# Patient Record
Sex: FEMALE | Race: WHITE | Employment: UNEMPLOYED | URBAN - METROPOLITAN AREA
[De-identification: names, ages, dates, MRNs, and addresses within clinical notes are randomized per-mention and may not be internally consistent; named-entity substitution may affect disease eponyms.]

---

## 2021-09-10 ENCOUNTER — APPOINTMENT (OUTPATIENT)
Dept: ULTRASOUND IMAGING | Age: 66
End: 2021-09-10
Attending: EMERGENCY MEDICINE
Payer: MEDICARE

## 2021-09-10 ENCOUNTER — APPOINTMENT (OUTPATIENT)
Dept: GENERAL RADIOLOGY | Age: 66
End: 2021-09-10
Attending: EMERGENCY MEDICINE
Payer: MEDICARE

## 2021-09-10 ENCOUNTER — APPOINTMENT (OUTPATIENT)
Dept: CT IMAGING | Age: 66
End: 2021-09-10
Attending: EMERGENCY MEDICINE
Payer: MEDICARE

## 2021-09-10 ENCOUNTER — HOSPITAL ENCOUNTER (EMERGENCY)
Age: 66
Discharge: HOME | End: 2021-09-10
Attending: EMERGENCY MEDICINE
Payer: MEDICARE

## 2021-09-10 VITALS
BODY MASS INDEX: 34.73 KG/M2 | RESPIRATION RATE: 16 BRPM | DIASTOLIC BLOOD PRESSURE: 74 MMHG | WEIGHT: 188.71 LBS | HEIGHT: 62 IN | TEMPERATURE: 98.5 F | OXYGEN SATURATION: 98 % | SYSTOLIC BLOOD PRESSURE: 118 MMHG | HEART RATE: 68 BPM

## 2021-09-10 DIAGNOSIS — R06.09 DOE (DYSPNEA ON EXERTION): Primary | ICD-10-CM

## 2021-09-10 DIAGNOSIS — R93.2 ABNORMAL CT OF LIVER: ICD-10-CM

## 2021-09-10 DIAGNOSIS — D50.9 HYPOCHROMIC ANEMIA: ICD-10-CM

## 2021-09-10 DIAGNOSIS — Q44.1 GALLBLADDER ANOMALY: ICD-10-CM

## 2021-09-10 DIAGNOSIS — I78.0 HHT (HEREDITARY HEMORRHAGIC TELANGIECTASIA) (HCC): ICD-10-CM

## 2021-09-10 LAB
ALBUMIN SERPL-MCNC: 3.6 G/DL (ref 3.5–5)
ALBUMIN/GLOB SERPL: 1 {RATIO} (ref 1.1–2.2)
ALP SERPL-CCNC: 71 U/L (ref 45–117)
ALT SERPL-CCNC: 26 U/L (ref 12–78)
ANION GAP SERPL CALC-SCNC: 5 MMOL/L (ref 5–15)
AST SERPL-CCNC: 20 U/L (ref 15–37)
ATRIAL RATE: 67 BPM
BASOPHILS # BLD: 0 K/UL (ref 0–0.1)
BASOPHILS NFR BLD: 1 % (ref 0–1)
BILIRUB SERPL-MCNC: 0.3 MG/DL (ref 0.2–1)
BNP SERPL-MCNC: 54 PG/ML (ref 0–125)
BUN SERPL-MCNC: 18 MG/DL (ref 6–20)
BUN/CREAT SERPL: 22 (ref 12–20)
CALCIUM SERPL-MCNC: 9 MG/DL (ref 8.5–10.1)
CALCULATED P AXIS, ECG09: 35 DEGREES
CALCULATED R AXIS, ECG10: 25 DEGREES
CALCULATED T AXIS, ECG11: -21 DEGREES
CHLORIDE SERPL-SCNC: 104 MMOL/L (ref 97–108)
CO2 SERPL-SCNC: 33 MMOL/L (ref 21–32)
COMMENT, HOLDF: NORMAL
CREAT SERPL-MCNC: 0.83 MG/DL (ref 0.55–1.02)
D DIMER PPP FEU-MCNC: 0.53 MG/L FEU (ref 0–0.65)
DIAGNOSIS, 93000: NORMAL
DIFFERENTIAL METHOD BLD: ABNORMAL
EOSINOPHIL # BLD: 0.2 K/UL (ref 0–0.4)
EOSINOPHIL NFR BLD: 2 % (ref 0–7)
ERYTHROCYTE [DISTWIDTH] IN BLOOD BY AUTOMATED COUNT: 17.3 % (ref 11.5–14.5)
GLOBULIN SER CALC-MCNC: 3.6 G/DL (ref 2–4)
GLUCOSE SERPL-MCNC: 107 MG/DL (ref 65–100)
HCT VFR BLD AUTO: 38.2 % (ref 35–47)
HGB BLD-MCNC: 11.3 G/DL (ref 11.5–16)
IMM GRANULOCYTES # BLD AUTO: 0 K/UL (ref 0–0.04)
IMM GRANULOCYTES NFR BLD AUTO: 0 % (ref 0–0.5)
LYMPHOCYTES # BLD: 1.2 K/UL (ref 0.8–3.5)
LYMPHOCYTES NFR BLD: 16 % (ref 12–49)
MCH RBC QN AUTO: 23.8 PG (ref 26–34)
MCHC RBC AUTO-ENTMCNC: 29.6 G/DL (ref 30–36.5)
MCV RBC AUTO: 80.6 FL (ref 80–99)
MONOCYTES # BLD: 0.6 K/UL (ref 0–1)
MONOCYTES NFR BLD: 7 % (ref 5–13)
NEUTS SEG # BLD: 5.6 K/UL (ref 1.8–8)
NEUTS SEG NFR BLD: 74 % (ref 32–75)
NRBC # BLD: 0 K/UL (ref 0–0.01)
NRBC BLD-RTO: 0 PER 100 WBC
P-R INTERVAL, ECG05: 134 MS
PLATELET # BLD AUTO: 301 K/UL (ref 150–400)
PMV BLD AUTO: 10.9 FL (ref 8.9–12.9)
POTASSIUM SERPL-SCNC: 4.7 MMOL/L (ref 3.5–5.1)
PROT SERPL-MCNC: 7.2 G/DL (ref 6.4–8.2)
Q-T INTERVAL, ECG07: 384 MS
QRS DURATION, ECG06: 72 MS
QTC CALCULATION (BEZET), ECG08: 405 MS
RBC # BLD AUTO: 4.74 M/UL (ref 3.8–5.2)
SAMPLES BEING HELD,HOLD: NORMAL
SODIUM SERPL-SCNC: 142 MMOL/L (ref 136–145)
TROPONIN I SERPL-MCNC: <0.05 NG/ML
VENTRICULAR RATE, ECG03: 67 BPM
WBC # BLD AUTO: 7.6 K/UL (ref 3.6–11)

## 2021-09-10 PROCEDURE — 74011000636 HC RX REV CODE- 636: Performed by: EMERGENCY MEDICINE

## 2021-09-10 PROCEDURE — 99284 EMERGENCY DEPT VISIT MOD MDM: CPT

## 2021-09-10 PROCEDURE — 93005 ELECTROCARDIOGRAM TRACING: CPT

## 2021-09-10 PROCEDURE — 80053 COMPREHEN METABOLIC PANEL: CPT

## 2021-09-10 PROCEDURE — 85025 COMPLETE CBC W/AUTO DIFF WBC: CPT

## 2021-09-10 PROCEDURE — 71046 X-RAY EXAM CHEST 2 VIEWS: CPT

## 2021-09-10 PROCEDURE — 71275 CT ANGIOGRAPHY CHEST: CPT

## 2021-09-10 PROCEDURE — 84484 ASSAY OF TROPONIN QUANT: CPT

## 2021-09-10 PROCEDURE — 85379 FIBRIN DEGRADATION QUANT: CPT

## 2021-09-10 PROCEDURE — 93971 EXTREMITY STUDY: CPT

## 2021-09-10 PROCEDURE — 83880 ASSAY OF NATRIURETIC PEPTIDE: CPT

## 2021-09-10 RX ORDER — OMEPRAZOLE 40 MG/1
40 CAPSULE, DELAYED RELEASE ORAL DAILY
COMMUNITY

## 2021-09-10 RX ORDER — MORPHINE SULFATE 15 MG/1
30 TABLET ORAL
COMMUNITY

## 2021-09-10 RX ORDER — FLUOXETINE HYDROCHLORIDE 20 MG/1
20 CAPSULE ORAL DAILY
COMMUNITY

## 2021-09-10 RX ADMIN — IOPAMIDOL 100 ML: 755 INJECTION, SOLUTION INTRAVENOUS at 13:55

## 2021-09-10 NOTE — ED PROVIDER NOTES
71-year-old female with a history of arthritis, anemia, Raynaud's disease, chronic pain, hereditary hemorrhagic telangiectasia has had severe dyspnea on exertion for the past 2 weeks while also experiencing new right lower extremity swelling and pain for the same timeline. No recent travel or immobilization or surgery. No history of venous thromboembolism. No cough or chest pain or fever. Her daughter is concerned because of the degree of sweating and difficulty breathing the patient has when walking even a short distance. Past Medical History:   Diagnosis Date    Anemia     Arthritis     Autoimmune disease ([facility] [address]     REYNAUDS DISEASE    Chronic pain SINCE     NERVE PAIN R LEG POST-BACK SURGERY    Chronic pain     RT. CARPAL TUNNEL    HHT (hereditary hemorrhagic telangiectasia) ([facility] [address]     Hx of transfusion of whole blood     [facility], NO REACTION    Nausea & vomiting     Raynauds syndrome     HANDS & FEET       Past Surgical History:   Procedure Laterality Date    HX BREAST BIOPSY      RT.    HX DILATION AND CURETTAGE      HX GI      COLONOSOCPY    HX GI      ENDOSCOPY    HX ORTHOPAEDIC      L5S1 DISCECTOMY    HX ORTHOPAEDIC      L5S1 FUSION    HX OTHER SURGICAL  1973    1ST RIBS BILAT, 2ND RIB ON R REMOVED, SYMPATHECTOMY    HX TONSILLECTOMY  2YO         Family History:   Problem Relation Age of Onset    Breast Cancer Mother     Heart Attack Father     Hypertension Sister        Social History     Socioeconomic History    Marital status:      Spouse name: Not on file    Number of children: Not on file    Years of education: Not on file    Highest education level: Not on file   Occupational History    Not on file   Tobacco Use    Smoking status: Former Smoker     Packs/day: 1.00     Years: 25.00     Pack years: 25.00     Quit date: 1995     Years since quittin.6    Smokeless tobacco: Never Used   Substance and Sexual Activity    Alcohol use: Yes     Alcohol/week: 4.0 standard drinks     Types: 4 Glasses of wine per week     Comment: OCCASISONAL AVG 4X A YEAR    Drug use: No    Sexual activity: Not on file   Other Topics Concern    Not on file   Social History Narrative    Not on file     Social Determinants of Health     Financial Resource Strain:     Difficulty of Paying Living Expenses:    Food Insecurity:     Worried About Running Out of Food in the Last Year:     [address] in the Last Year:    Transportation Needs:     Lack of Transportation (Medical):  Lack of Transportation (Non-Medical):    Physical Activity:     Days of Exercise per Week:     Minutes of Exercise per Session:    Stress:     Feeling of Stress :    Social Connections:     Frequency of Communication with Friends and Family:     Frequency of Social Gatherings with Friends and Family:     Attends L[employer] day Services:     Active Member of Clubs or Organizations:     Attends Club or Organization Meetings:     Marital Status:    Intimate Partner Violence:     Fear of Current or Ex-Partner:     Emotionally Abused:     Physically Abused:     Sexually Abused: ALLERGIES: Adhesive and Nortriptyline    Review of Systems   Constitutional: Negative for fever. HENT: Negative for trouble swallowing. Eyes: Negative for visual disturbance. Respiratory: Positive for shortness of breath. Negative for cough. Cardiovascular: Negative for chest pain. Gastrointestinal: Negative for abdominal pain. Genitourinary: Negative for difficulty urinating. Musculoskeletal: Negative for gait problem. Skin: Negative for rash. Neurological: Negative for headaches. Hematological: Does not bruise/bleed easily. Psychiatric/Behavioral: Negative for sleep disturbance.        Vitals:    09/10/21 1230 09/10/21 1245 09/10/21 1300 09/10/21 1315   BP: 128/69 115/74 126/67 108/78   Pulse: 62 66 (!) 59 72   Resp: 16 16 13 11   Temp:       SpO2: 96% 98% 93% 94%   Weight:       Height:                Physical Exam  Constitutional:       Appearance: Normal appearance. HENT:      Head: Normocephalic. Nose: Nose normal.      Mouth/Throat:      Mouth: Mucous membranes are moist.   Eyes:      Extraocular Movements: Extraocular movements intact. Conjunctiva/sclera: Conjunctivae normal.   Cardiovascular:      Rate and Rhythm: Normal rate. Pulmonary:      Effort: Pulmonary effort is normal. No respiratory distress. Abdominal:      General: Abdomen is flat. Palpations: Abdomen is soft. Tenderness: There is abdominal tenderness. Musculoskeletal:         General: Normal range of motion. Right lower leg: Tenderness present. Edema present. Skin:     Findings: No rash. Neurological:      General: No focal deficit present. Mental Status: She is alert. Psychiatric:         Behavior: Behavior normal.          MDM  Number of Diagnoses or Management Options  Abnormal CT of liver  [place] (dyspnea on exertion)  Gallbladder anomaly  HHT (hereditary hemorrhagic telangiectasia) (HCC)  Hypochromic anemia  Diagnosis management comments: EKG at 1109  Normal sinus with normal axis at a rate of 67  DE, QRS, and QTc all within normal limits  No ST changes  Nonischemic    Concerning story for cardiac or pulmonary causes, but no evidence of venous thromboembolism. No arrhythmia or evidence of acute coronary syndrome. No signs of heart failure. We walked the patient all around the emergency department and she was never hypoxic. Only at the very end of the long walk that she started to breathe a little heavier. She has never out of breath and breathing quickly came back to normal when she rested. Never any chest pain with it.   I discussed admission versus outpatient follow-up and will agreed she has good follow-up with her primary care doctor in would be as well served as an outpatient as she would [facility]. The reality is most of our ER patients are boarding in the emergency department while waiting for beds upstairs. I do not see that benefiting her. I think she needs evaluation by her primary care doctor and potentially further testing, such as echocardiogram or other advanced tests like stress test.  I think she may need to see cardiology or pulmonology, but I would like her primary to make that call. She feels comfortable with that plan. She knows to return should she have any chest pain or significant shortness of breath or other concerning symptoms. Her daughter was present for this evaluation and for discussions and is in agreement.          Procedures

## 2021-09-10 NOTE — ED NOTES
Patient ambulated around the emergency department attached to pulse ox. Patient maintained O2 saturations betweek 96 and 100% the entire walk. Patient walked about 120 feet before feeling short of breath. Patient reported shortness of breath and appeared to be short of breath, but continued to walk at same pace. Dr. Blaire Guzman notified.

## 2021-09-10 NOTE — ED TRIAGE NOTES
Pt. Complains of shortness of breath that has been going on 2 weeks when moving. Pt. Also has right leg swelling.

## 2021-09-10 NOTE — ED NOTES
Dr. Guille Fong reviewed discharge instructions with the patient. The patient verbalized understanding. Patient ambulated out of the emergency department without assistance. Patient is in no apparent distress.

## 2025-02-28 ENCOUNTER — HOSPITAL ENCOUNTER (OUTPATIENT)
Age: 70
Discharge: HOME | End: 2025-02-28
Payer: MEDICARE

## 2025-02-28 DIAGNOSIS — M54.16 CHRONIC RADICULAR PAIN OF LOWER BACK: ICD-10-CM

## 2025-02-28 DIAGNOSIS — G89.29 CHRONIC RADICULAR PAIN OF LOWER BACK: ICD-10-CM

## 2025-02-28 PROCEDURE — 72148 MRI LUMBAR SPINE W/O DYE: CPT

## 2025-04-15 ENCOUNTER — TELEPHONE (OUTPATIENT)
Age: 70
End: 2025-04-15

## 2025-04-15 NOTE — TELEPHONE ENCOUNTER
Left message to call back to schedule EMG. Order from [facility].

## 2025-05-07 ENCOUNTER — PROCEDURE VISIT (OUTPATIENT)
Age: 70
End: 2025-05-07
Payer: MEDICARE

## 2025-05-07 DIAGNOSIS — M54.10 RADICULAR PAIN OF RIGHT LOWER EXTREMITY: Primary | ICD-10-CM

## 2025-05-07 PROCEDURE — 95886 MUSC TEST DONE W/N TEST COMP: CPT | Performed by: PSYCHIATRY & NEUROLOGY

## 2025-05-07 PROCEDURE — 95910 NRV CNDJ TEST 7-8 STUDIES: CPT | Performed by: PSYCHIATRY & NEUROLOGY

## 2025-05-07 NOTE — PROGRESS NOTES
[facility]  [facility]  [address] [place] [address], [address]  [place] [address]  Phone [phone] Fax [phone]     Test Date:  2025    Patient: Indu Martins : 1955 Physician: Anette Kendall DO   Sex: Female Height: 5' 2\" Ref Phys: Basia Orr [facility]   ID#: [id number] Weight: 188 lbs. Technician: Ermias Mello LPN     Patient Complaints:  Radicular pain of the right lower extremity; h/o lumbar decompression and fusion     NCV & EMG Findings:  All nerve conduction studies (as indicated in the following tables) were within normal limits.  All left vs. right side differences were within normal limits.      All F Wave latencies were within normal limits.  All F Wave left vs. right side latency differences were within normal limits.      All examined muscles (as indicated in the following table) showed no evidence of electrical instability.      Impression:  Extensive electrodiagnostic examination of the right lower extremity and additional nerve conduction studies of the left lower extremity reveals no abnormalities.    In particular, there is no evidence of a right lumbosacral motor radiculopathy.  In addition, there is no evidence of a generalized sensorimotor polyneuropathy.       ___________________________  Anette Kendall,         Nerve Conduction Studies  Anti Sensory Summary Table     Stim Site NR Peak (ms) Norm Peak (ms) P-T Amp (µV) Norm P-T Amp Site1 Site2 Delta-P (ms) Dist (cm) Bunny (m/s) Norm Bunny (m/s)   Left Sup Fibular Anti Sensory (Ant Lat Mall)  30.1 °C   14 cm    2.1 <4.4 6.3 >5.0 14 cm Ant Lat Mall 2.1 14.0 67 >32   Right Sup Fibular Anti Sensory (Ant Lat Mall)  30.4 °C   14 cm    1.9 <4.4 18.7 >5.0 14 cm Ant Lat Mall 1.9 14.0 74 >32   Left Sural Anti Sensory (Lat Mall)  30.3 °C   Calf    2.9 <4.0 9.0 >5.0 Calf Lat Mall 2.9 14.0 48 >35   Right Sural Anti Sensory (Lat Mall)  30.1 °C   Calf    3.0 <4.0 8.8 >5.0 Calf Lat